# Patient Record
Sex: FEMALE | Race: OTHER | URBAN - METROPOLITAN AREA
[De-identification: names, ages, dates, MRNs, and addresses within clinical notes are randomized per-mention and may not be internally consistent; named-entity substitution may affect disease eponyms.]

---

## 2022-04-18 ENCOUNTER — COMPREHENSIVE EXAM (OUTPATIENT)
Dept: URBAN - METROPOLITAN AREA CLINIC 23 | Facility: CLINIC | Age: 67
End: 2022-04-18

## 2022-04-18 DIAGNOSIS — H35.369: ICD-10-CM

## 2022-04-18 DIAGNOSIS — H52.222: ICD-10-CM

## 2022-04-18 DIAGNOSIS — H40.013: ICD-10-CM

## 2022-04-18 DIAGNOSIS — H25.013: ICD-10-CM

## 2022-04-18 DIAGNOSIS — H52.4: ICD-10-CM

## 2022-04-18 DIAGNOSIS — H25.13: ICD-10-CM

## 2022-04-18 DIAGNOSIS — H52.03: ICD-10-CM

## 2022-04-18 PROCEDURE — 99204 OFFICE O/P NEW MOD 45 MIN: CPT

## 2022-04-18 PROCEDURE — 92015 DETERMINE REFRACTIVE STATE: CPT

## 2022-04-18 ASSESSMENT — VISUAL ACUITY
OU_SC: 20/25+2
OU_SC: 20/50
OD_SC: 20/25
OS_SC: 20/30-3

## 2022-04-18 ASSESSMENT — TONOMETRY
OD_IOP_MMHG: 09
OS_IOP_MMHG: 09

## 2022-04-18 NOTE — PATIENT DISCUSSION
Advised patient of their anatomical risk factor such as larger [address] size OD>OS. IOP is low/normal and in the target range. RTC [id number] for comprehensive exam and to watch for any changes over time.

## 2022-04-18 NOTE — PATIENT DISCUSSION
Reviewed retinal findings with patient. Monitor carefully for changes over time with annual dilated retinal exam. Continue Maxi-Vision ocular vitamins QD PO.